# Patient Record
Sex: MALE | Race: BLACK OR AFRICAN AMERICAN | NOT HISPANIC OR LATINO | URBAN - METROPOLITAN AREA
[De-identification: names, ages, dates, MRNs, and addresses within clinical notes are randomized per-mention and may not be internally consistent; named-entity substitution may affect disease eponyms.]

---

## 2024-11-23 ENCOUNTER — EMERGENCY (EMERGENCY)
Facility: HOSPITAL | Age: 34
LOS: 1 days | Discharge: HOME | End: 2024-11-23
Attending: EMERGENCY MEDICINE | Admitting: EMERGENCY MEDICINE
Payer: SELF-PAY

## 2024-11-23 VITALS
TEMPERATURE: 98 F | DIASTOLIC BLOOD PRESSURE: 84 MMHG | SYSTOLIC BLOOD PRESSURE: 147 MMHG | WEIGHT: 175.05 LBS | HEART RATE: 87 BPM | RESPIRATION RATE: 15 BRPM | OXYGEN SATURATION: 100 %

## 2024-11-23 PROCEDURE — 99053 MED SERV 10PM-8AM 24 HR FAC: CPT

## 2024-11-23 PROCEDURE — 99283 EMERGENCY DEPT VISIT LOW MDM: CPT

## 2024-11-23 NOTE — ED ADULT TRIAGE NOTE - CHIEF COMPLAINT QUOTE
presents for STD testing. was sexually active with person with herpes. No complaints of chest pain, headache, nausea, dizziness, vomiting  SOB, fever, chills verbalized. no phx

## 2024-11-24 NOTE — ED PROVIDER NOTE - CLINICAL SUMMARY MEDICAL DECISION MAKING FREE TEXT BOX
Patient presents emergency department requesting HSV testing.  Patient is hemodynamically stable afebrile presentation.  Patient exam unremarkable at this time.  Patient denies any lesions at this time.  Patient counseled on the false positive rates of HSV.  Patient given strict return precautions.  At this time patient is electing not to get tested.  Patient will follow-up with PCP.

## 2024-11-24 NOTE — ED PROVIDER NOTE - NSFOLLOWUPINSTRUCTIONS_ED_ALL_ED_FT
Ipledge Number (Optional): [id number] Ipledge Number (Optional): [id number] A sexually transmitted disease (STD) is a disease or infection that may be passed (transmitted) from person to person, usually during sexual activity. This may happen by way of saliva, semen, blood, vaginal mucus, or urine. Symptoms vary depending on the type of STD acquired and may include pain in the groin, discharge, and lesions or a rash. If you are started on an antibiotic, take it exactly as instructed. Avoid sexual contact of any kind until cleared by a health care professional. Contact your sexual partner(s) to inform them of your diagnosis so that they may be tested and treated as well.    SEEK IMMEDIATE MEDICAL CARE IF YOU HAVE ANY OF THE FOLLOWING SYMPTOMS: severe abdominal pain, high fever, nausea/vomiting, or unintended weight loss.

## 2024-11-24 NOTE — ED PROVIDER NOTE - OBJECTIVE STATEMENT
Patient is a 34-year-old male with no stated past medical history presents emergency department for STD testing.  Patient states earlier this month he was exposed to individual who was positive for STDs.  Patient states he went to his primary care doctor and was tested for HIV, chlamydia, gonorrhea, HI syphilis.  Patient states that he wanted to come in to get evaluated for herpes.  Denies any lesions.  Denies any symptoms at this time.  Patient states that testing for his other STDs was negative. Patient is a 34-year-old male with no stated past medical history presents emergency department for STD testing.  Patient states earlier this month he was exposed to individual who was positive for STDs.  Patient states he went to his primary care doctor and was tested for HIV, chlamydia, gonorrhea, HI syphilis.  Patient states that he wanted to come in to get evaluated for herpes.  Denies any lesions.  Denies any symptoms at this time.  Patient states that testing for his other STDs was negative.    Attendin-year-old male presents after being exposed to herpes requesting testing for herpes.  Patient has no lesions at this time.  He was already tested for HIV chlamydia gonorrhea and syphilis.

## 2024-11-24 NOTE — ED PROVIDER NOTE - PATIENT PORTAL LINK FT
You can access the FollowMyHealth Patient Portal offered by [facility] by registering at the following website: [email or web address]. By joining [employer] [facility] [employer]’s FollowMyHealth portal, you will also be able to view your health information using other applications (apps) compatible with our system.